# Patient Record
Sex: FEMALE | Race: WHITE
[De-identification: names, ages, dates, MRNs, and addresses within clinical notes are randomized per-mention and may not be internally consistent; named-entity substitution may affect disease eponyms.]

---

## 2018-10-10 ENCOUNTER — HOSPITAL ENCOUNTER (OUTPATIENT)
Dept: HOSPITAL 75 - CARD | Age: 43
End: 2018-10-10
Attending: INTERNAL MEDICINE
Payer: MEDICAID

## 2018-10-10 VITALS — SYSTOLIC BLOOD PRESSURE: 157 MMHG | DIASTOLIC BLOOD PRESSURE: 89 MMHG

## 2018-10-10 DIAGNOSIS — I49.3: ICD-10-CM

## 2018-10-10 DIAGNOSIS — I10: Primary | ICD-10-CM

## 2018-10-10 DIAGNOSIS — F32.9: ICD-10-CM

## 2018-10-10 DIAGNOSIS — R00.2: ICD-10-CM

## 2018-10-10 PROCEDURE — 93017 CV STRESS TEST TRACING ONLY: CPT

## 2018-10-10 PROCEDURE — 78452 HT MUSCLE IMAGE SPECT MULT: CPT

## 2018-10-11 ENCOUNTER — HOSPITAL ENCOUNTER (OUTPATIENT)
Dept: HOSPITAL 75 - CARD | Age: 43
End: 2018-10-11
Attending: INTERNAL MEDICINE
Payer: MEDICAID

## 2018-10-11 DIAGNOSIS — I49.3: ICD-10-CM

## 2018-10-11 DIAGNOSIS — F32.9: ICD-10-CM

## 2018-10-11 DIAGNOSIS — R00.2: ICD-10-CM

## 2018-10-11 DIAGNOSIS — I10: Primary | ICD-10-CM

## 2018-10-11 PROCEDURE — 93225 XTRNL ECG REC<48 HRS REC: CPT

## 2018-10-11 PROCEDURE — 93226 XTRNL ECG REC<48 HR SCAN A/R: CPT

## 2018-10-11 NOTE — STRESS TEST
DATE OF SERVICE:  10/10/2018



EXERCISE MYOVIEW STRESS TEST REPORT



REFERRING PHYSICIAN:

Mariya Juan MD.



Baseline heart rate 77, baseline blood pressure 156/72.  Baseline EKG is sinus

rhythm with frequent premature ventricular contractions and ventricular

bigeminy.



In summary, the patient was injected with 9.4 mCi of technetium-99 Myoview and

the resting images were obtained.  Then, the patient started exercising with a

baseline heart rate, blood pressure and EKG mentioned above.  She was able to

exercise for a total of 8 minutes on standard Anil protocol.  During exercise,

there were no premature ventricular contractions were noted.  At peak exercise

level, there were minimal nondiagnostic EKG changes with 1 mm upsloping ST

depression in II, III, aVF, V4 and V5.  Blood pressure was 157/87.  During

recovery, heart rate and blood pressure returned to baseline.  EKG returned to

baseline.



The resting and stress images were reviewed and compared in the short axis,

horizontal long axis, and vertical long axis views.  Review of the images showed

no significant ischemia or infarction on SPECT images.  SSS is 3, SDS 1, TID

value 1.08.  On the gated images, the left ventricle appeared to be in normal

size with normal contractility.  Calculated ejection fraction 68%.



CONCLUSION:

1.  Good exercise tolerance, a total of 8 minutes on standard Anil protocol,

total of 9.7 METS achieving 86% of maximum expected heart rate.

2.  Frequent premature ventricular contractions noted before test that improved

with exercise and no further PVCs noted during test.

3.  Minimal nondiagnostic EKG changes with exercise returned to baseline during

recovery.

4.  No ischemia or infarction on SPECT images.

5.  Normal left ventricular size with normal contractility.  Calculated ejection

fraction 68%.





Job ID: [id number]

DocumentID: [id number]

Dictated Date:  10/11/2018 06:56:50

Transcription Date: 10/11/2018 07:43:23

Dictated By: FEDE SNYDER MD

## 2018-10-30 ENCOUNTER — HOSPITAL ENCOUNTER (OUTPATIENT)
Dept: HOSPITAL 75 - CARD | Age: 43
End: 2018-10-30
Attending: INTERNAL MEDICINE
Payer: MEDICAID

## 2018-10-30 DIAGNOSIS — R00.2: ICD-10-CM

## 2018-10-30 DIAGNOSIS — F32.9: ICD-10-CM

## 2018-10-30 DIAGNOSIS — I49.3: ICD-10-CM

## 2018-10-30 DIAGNOSIS — I34.0: ICD-10-CM

## 2018-10-30 DIAGNOSIS — I10: Primary | ICD-10-CM

## 2018-10-30 PROCEDURE — 93306 TTE W/DOPPLER COMPLETE: CPT

## 2018-11-26 ENCOUNTER — HOSPITAL ENCOUNTER (OUTPATIENT)
Dept: HOSPITAL 75 - CARD | Age: 43
End: 2018-11-26
Attending: INTERNAL MEDICINE
Payer: MEDICAID

## 2018-11-26 DIAGNOSIS — I49.3: Primary | ICD-10-CM

## 2018-11-26 DIAGNOSIS — R00.2: ICD-10-CM

## 2018-11-26 PROCEDURE — 93225 XTRNL ECG REC<48 HRS REC: CPT

## 2018-11-26 PROCEDURE — 93226 XTRNL ECG REC<48 HR SCAN A/R: CPT
